# Patient Record
Sex: FEMALE | Race: WHITE | URBAN - METROPOLITAN AREA
[De-identification: names, ages, dates, MRNs, and addresses within clinical notes are randomized per-mention and may not be internally consistent; named-entity substitution may affect disease eponyms.]

---

## 2017-04-23 ENCOUNTER — OFFICE VISIT (OUTPATIENT)
Dept: URGENT CARE | Facility: RETAIL CLINIC | Age: 67
End: 2017-04-23
Payer: COMMERCIAL

## 2017-04-23 VITALS — TEMPERATURE: 99.6 F | OXYGEN SATURATION: 96 % | HEART RATE: 78 BPM | WEIGHT: 178 LBS | RESPIRATION RATE: 12 BRPM

## 2017-04-23 DIAGNOSIS — N30.00 ACUTE CYSTITIS WITHOUT HEMATURIA: Primary | ICD-10-CM

## 2017-04-23 DIAGNOSIS — R30.0 DYSURIA: ICD-10-CM

## 2017-04-23 LAB
APPEARANCE UR: ABNORMAL
BILIRUB UR QL: ABNORMAL
COLOR UR: ABNORMAL
GLUCOSE URINE: ABNORMAL MG/DL
HGB UR QL: ABNORMAL
KETONES UR QL: 5 MG/DL
LEUKOCYTE ESTERASE URINE: ABNORMAL
NITRITE UR QL STRIP: ABNORMAL
PH UR STRIP: 6 PH (ref 5–7)
PROTEIN ALBUMIN URINE: ABNORMAL MG/DL
SOURCE: ABNORMAL
SP GR UR STRIP: 1.01 (ref 1–1.03)
UROBILINOGEN UR QL STRIP: 0.2 EU/DL (ref 0.2–1)

## 2017-04-23 PROCEDURE — 87086 URINE CULTURE/COLONY COUNT: CPT | Performed by: PHYSICIAN ASSISTANT

## 2017-04-23 PROCEDURE — 81002 URINALYSIS NONAUTO W/O SCOPE: CPT | Mod: QW | Performed by: PHYSICIAN ASSISTANT

## 2017-04-23 PROCEDURE — 99203 OFFICE O/P NEW LOW 30 MIN: CPT | Performed by: PHYSICIAN ASSISTANT

## 2017-04-23 RX ORDER — DULOXETIN HYDROCHLORIDE 60 MG/1
60 CAPSULE, DELAYED RELEASE ORAL DAILY
Qty: 30 CAPSULE | Refills: 1 | COMMUNITY
Start: 2017-04-23

## 2017-04-23 RX ORDER — CIPROFLOXACIN 250 MG/1
250 TABLET, FILM COATED ORAL 2 TIMES DAILY
Qty: 6 TABLET | Refills: 0 | Status: SHIPPED | OUTPATIENT
Start: 2017-04-23 | End: 2017-04-26

## 2017-04-23 RX ORDER — WARFARIN SODIUM 2 MG/1
2 TABLET ORAL DAILY
Qty: 30 TABLET | COMMUNITY
Start: 2017-04-23

## 2017-04-23 RX ORDER — LEVOTHYROXINE SODIUM 150 UG/1
150 TABLET ORAL DAILY
Qty: 90 TABLET | Refills: 1 | COMMUNITY
Start: 2017-04-23

## 2017-04-23 RX ORDER — QUETIAPINE 400 MG/1
400 TABLET, FILM COATED, EXTENDED RELEASE ORAL DAILY
Qty: 60 TABLET | COMMUNITY
Start: 2017-04-23

## 2017-04-23 RX ORDER — TRAZODONE HYDROCHLORIDE 50 MG/1
50 TABLET, FILM COATED ORAL
Qty: 30 TABLET | Refills: 1 | COMMUNITY
Start: 2017-04-23

## 2017-04-23 RX ORDER — HALOPERIDOL 5 MG/1
5 TABLET ORAL DAILY
Qty: 60 TABLET | Refills: 1 | COMMUNITY
Start: 2017-04-23

## 2017-04-23 RX ORDER — HYOSCYAMINE SULFATE 0.125 MG
0.125-0.25 TABLET ORAL EVERY 4 HOURS PRN
Qty: 40 TABLET | Refills: 1 | COMMUNITY
Start: 2017-04-23

## 2017-04-23 RX ORDER — PROPRANOLOL HYDROCHLORIDE 160 MG/1
160 CAPSULE, EXTENDED RELEASE ORAL 2 TIMES DAILY
Qty: 90 CAPSULE | Refills: 1 | COMMUNITY
Start: 2017-04-23

## 2017-04-23 RX ORDER — ATORVASTATIN CALCIUM 20 MG/1
20 TABLET, FILM COATED ORAL DAILY
Qty: 90 TABLET | Refills: 3 | COMMUNITY
Start: 2017-04-23

## 2017-04-23 RX ORDER — CEFADROXIL 500 MG/1
500 CAPSULE ORAL PRN
Qty: 1 CAPSULE | Status: CANCELLED | COMMUNITY
Start: 2017-04-23

## 2017-04-23 RX ORDER — HYDROCODONE BITARTRATE AND ACETAMINOPHEN 5; 325 MG/1; MG/1
1-2 TABLET ORAL EVERY 4 HOURS PRN
Qty: 20 TABLET | Refills: 0 | COMMUNITY
Start: 2017-04-23

## 2017-04-23 ASSESSMENT — PAIN SCALES - GENERAL: PAINLEVEL: MODERATE PAIN (5)

## 2017-04-23 NOTE — MR AVS SNAPSHOT
"              After Visit Summary   4/23/2017    Carmen Serrano    MRN: [id number]           Patient Information     Date Of Birth          1950        Visit Information        Provider Department      4/23/2017 1:30 PM Melani Uriostegui PA-C [facility]        Today's Diagnoses     Acute cystitis without hematuria    -  1    Dysuria          Care Instructions       * BLADDER INFECTION,Female (Adult)    A bladder infection (\"cystitis\" or \"UTI\") usually causes a constant urge to urinate and a burning when passing urine. Urine may be cloudy, smelly or dark. There may be pain in the lower abdomen. A bladder infection occurs when bacteria from the vaginal area enter the bladder opening (urethra). This can occur from sexual intercourse, wearing tight clothing, dehydration and other factors.  HOME CARE:  1. Drink lots of fluids (at least 6-8 glasses a day, unless you must restrict fluids for other medical reasons). This will force the medicine into your urinary system and flush the bacteria out of your body. Cranberry juice has been shown to help clear out the bacteria.  2. Avoid sexual intercourse until your symptoms are gone.  3. A bladder infection is treated with antibiotics. You may also be given Pyridium (generic = phenazopyridine) to reduce the burning sensation. This medicine will cause your urine to become a bright orange color. The orange urine may stain clothing. You may wear a pad or panty-liner to protect clothing.  PREVENTING FUTURE INFECTIONS:  1. Always wipe from front to back after a bowel movement.  2. Keep the genital area clean and dry.  3. Drink plenty of fluids each day to avoid dehydration.  4. Urinate right after intercourse to flush out the bladder.  5. Wear cotton underwear and cotton-lined panty hose; avoid tight-fitting pants.  6. If you are on birth control pills and are having frequent bladder infections, discuss with your doctor.  FOLLOW UP: Return to this " "facility or see your doctor if ALL symptoms are not gone after three days of treatment.  GET PROMPT MEDICAL ATTENTION if any of the following occur:    Fever over 101 F (38.3 C)    No improvement by the third day of treatment    Increasing back or abdominal pain    Repeated vomiting; unable to keep medicine down    Weakness, dizziness or fainting    Vaginal discharge    Pain, redness or swelling in the labia (outer vaginal area)    5470-1289 [employer], [address] [place] [address], [place] [address]. All rights reserved. This information is not intended as a substitute for professional medical care. Always follow your healthcare professional's instructions.    ............................  We will contact you if the urine culture indicates a change in treatment plan.    ............................    Please let your coumadin clinic know tomorrow we are starting you on Cipro for bladder infection.   Culture pending.          Follow-ups after your visit        Who to contact     You can reach your care team any time of the day by calling [phone].  Notification of test results:  If you have an abnormal lab result, we will notify you by phone as soon as possible.         Additional Information About Your Visit        [employer] Information     [employer] lets you send messages to your doctor, view your test results, renew your prescriptions, schedule appointments and more. To sign up, go to [email or web address] . Click on \"Log in\" on the left side of the screen, which will take you to the Welcome page. Then click on \"Sign up Now\" on the right side of the page.     You will be asked to enter the access code listed below, as well as some personal information. Please follow the directions to create your username and password.     Your access code is: [email or web address]  Expires: 2017  1:45 PM     Your access code will  in 90 days. If you need help or a new code, please call your [facility] clinic or " [phone].        Care EveryWhere ID     This is your Care EveryWhere ID. This could be used by other organizations to access your Carmen medical records  [id number]        Your Vitals Were     Pulse Temperature Respirations Pulse Oximetry          78 99.6  F (37.6  C) (Tympanic) 12 96%         Blood Pressure from Last 3 Encounters:   12/27/12 (!) 140/103    Weight from Last 3 Encounters:   04/23/17 178 lb (80.7 kg)              We Performed the Following     HCL U/A, W/O MICRO, NON AUTO     Urine Culture Aerobic Bacterial          Today's Medication Changes          These changes are accurate as of: 4/23/17  1:45 PM.  If you have any questions, ask your nurse or doctor.               Start taking these medicines.        Dose/Directions    ciprofloxacin 250 MG tablet   Commonly known as:  CIPRO   Used for:  Acute cystitis without hematuria   Started by:  Melani Uriostegui PA-C        Dose:  250 mg   Take 1 tablet (250 mg) by mouth 2 times daily   Quantity:  6 tablet   Refills:  0            Where to get your medicines      These medications were sent to [address]  [address] [facility] [address]     Phone:  [phone]     ciprofloxacin 250 MG tablet                Primary Care Provider    None Specified       No primary provider on file.        Thank you!     Thank you for choosing [facility]  for your care. Our goal is always to provide you with excellent care. Hearing back from our patients is one way we can continue to improve our services. Please take a few minutes to complete the written survey that you may receive in the mail after your visit with us. Thank you!             Your Updated Medication List - Protect others around you: Learn how to safely use, store and throw away your medicines at www.disposemymeds.org.          This list is accurate as of: 4/23/17  1:45 PM.  Always use your most recent med list.                   Brand Name  Dispense Instructions for use    ciprofloxacin 250 MG tablet    CIPRO    6 tablet    Take 1 tablet (250 mg) by mouth 2 times daily       * CYMBALTA PO      Take  by mouth.       * DULoxetine 60 MG EC capsule    CYMBALTA    30 capsule    Take 1 capsule (60 mg) by mouth daily       haloperidol 5 MG tablet    HALDOL    60 tablet    Take 1 tablet (5 mg) by mouth daily       HYDROcodone-acetaminophen 5-325 MG per tablet    NORCO    20 tablet    Take 1-2 tablets by mouth every 4 hours as needed for moderate to severe pain       hyoscyamine 0.125 MG tablet    ANASPAZ/LEVSIN    40 tablet    Take 1-2 tablets (125-250 mcg) by mouth every 4 hours as needed for cramping       * Levothyroxine Sodium 125 MCG Caps      Take  by mouth.       * levothyroxine 150 MCG tablet    SYNTHROID/LEVOTHROID    90 tablet    Take 1 tablet (150 mcg) by mouth daily       LIPITOR 20 MG tablet   Generic drug:  atorvastatin     90 tablet    Take 1 tablet (20 mg) by mouth daily       omeprazole 20 MG CR capsule    priLOSEC    30 capsule    Take 1 capsule (20 mg) by mouth 2 times daily       PREMARIN PO      Take  by mouth.       PRILOSEC PO      Take  by mouth.       * PROPRANOLOL HCL PO      Take  by mouth.       * propranolol 160 MG 24 hr capsule    INDERAL LA    90 capsule    Take 1 capsule (160 mg) by mouth 2 times daily       SEROQUEL  MG 24 hr tablet   Generic drug:  QUEtiapine     60 tablet    Take 1 tablet (400 mg) by mouth daily       traZODone 50 MG tablet    DESYREL    30 tablet    Take 1 tablet (50 mg) by mouth nightly as needed for sleep       warfarin 2 MG tablet    COUMADIN    30 tablet    Take 1 tablet (2 mg) by mouth daily       * Notice:  This list has 6 medication(s) that are the same as other medications prescribed for you. Read the directions carefully, and ask your doctor or other care provider to review them with you.

## 2017-04-23 NOTE — PATIENT INSTRUCTIONS
"   * BLADDER INFECTION,Female (Adult)    A bladder infection (\"cystitis\" or \"UTI\") usually causes a constant urge to urinate and a burning when passing urine. Urine may be cloudy, smelly or dark. There may be pain in the lower abdomen. A bladder infection occurs when bacteria from the vaginal area enter the bladder opening (urethra). This can occur from sexual intercourse, wearing tight clothing, dehydration and other factors.  HOME CARE:  1. Drink lots of fluids (at least 6-8 glasses a day, unless you must restrict fluids for other medical reasons). This will force the medicine into your urinary system and flush the bacteria out of your body. Cranberry juice has been shown to help clear out the bacteria.  2. Avoid sexual intercourse until your symptoms are gone.  3. A bladder infection is treated with antibiotics. You may also be given Pyridium (generic = phenazopyridine) to reduce the burning sensation. This medicine will cause your urine to become a bright orange color. The orange urine may stain clothing. You may wear a pad or panty-liner to protect clothing.  PREVENTING FUTURE INFECTIONS:  1. Always wipe from front to back after a bowel movement.  2. Keep the genital area clean and dry.  3. Drink plenty of fluids each day to avoid dehydration.  4. Urinate right after intercourse to flush out the bladder.  5. Wear cotton underwear and cotton-lined panty hose; avoid tight-fitting pants.  6. If you are on birth control pills and are having frequent bladder infections, discuss with your doctor.  FOLLOW UP: Return to this facility or see your doctor if ALL symptoms are not gone after three days of treatment.  GET PROMPT MEDICAL ATTENTION if any of the following occur:    Fever over 101 F (38.3 C)    No improvement by the third day of treatment    Increasing back or abdominal pain    Repeated vomiting; unable to keep medicine down    Weakness, dizziness or fainting    Vaginal discharge    Pain, redness or swelling in " the labia (outer vaginal area)    [id number] [employer], [address] [place] [address], [place] [address]. All rights reserved. This information is not intended as a substitute for professional medical care. Always follow your healthcare professional's instructions.    ............................  We will contact you if the urine culture indicates a change in treatment plan.    ............................    Please let your coumadin clinic know tomorrow we are starting you on Cipro for bladder infection.   Culture pending.

## 2017-04-23 NOTE — PROGRESS NOTES
Carmen Serrano is a 66 year old female who  Presents to the [facility] today for a possible UTI. Symptoms of dysuria, frequency, and polyuria have been going on since yesterday.  There is no history of backache, fever, chills, nausea or vomiting.  No recent history of vaginal discharge or infection. No history of pyelonephritis. No concerns for STD's. This patient does  have a history of urinary tract infections - last about 6 months ago -treated with Cipro (states no problems)     Visiting here from [place] -will be here x 2 more weeks.    Has INR being done tomorrow and sending results to her Coumadin Clinic. On coumadin for about year.    Current Outpatient Prescriptions   Medication     warfarin (COUMADIN) 2 MG tablet     propranolol (INDERAL LA) 160 MG 24 hr capsule     HYDROcodone-acetaminophen (NORCO) 5-325 MG per tablet     levothyroxine (SYNTHROID/LEVOTHROID) 150 MCG tablet     atorvastatin (LIPITOR) 20 MG tablet     hyoscyamine (ANASPAZ/LEVSIN) 0.125 MG tablet     omeprazole (PRILOSEC) 20 MG CR capsule     DULoxetine (CYMBALTA) 60 MG EC capsule     traZODone (DESYREL) 50 MG tablet     haloperidol (HALDOL) 5 MG tablet     QUEtiapine (SEROQUEL XR) 400 MG 24 hr tablet     ciprofloxacin (CIPRO) 250 MG tablet     DULoxetine HCl (CYMBALTA PO)     PROPRANOLOL HCL PO     Omeprazole (PRILOSEC PO)     Estrogens Conjugated (PREMARIN PO)     Levothyroxine Sodium 125 MCG CAPS     No current facility-administered medications for this visit.          No past medical history on file.  Past Surgical History:   Procedure Laterality Date     ORTHOPEDIC SURGERY  6/2012    knee replacement     There is no problem list on file for this patient.    Current Outpatient Prescriptions   Medication     DULoxetine HCl (CYMBALTA PO)     PROPRANOLOL HCL PO     Omeprazole (PRILOSEC PO)     Estrogens Conjugated (PREMARIN PO)     Levothyroxine Sodium 125 MCG CAPS     No current facility-administered medications for this  "visit.        O: Pulse 78  Temp 99.6  F (37.6  C) (Tympanic)  Resp 12  Wt 178 lb (80.7 kg)  SpO2 96%  BP declined     The patient appears comfortable and in no apparent distress.  Afebrile.  Back: no CVA or flank tenderness.  Abdomen: soft, positive for bowel sounds, some  supra pubic tenderness.  Neck: supple, no adenopathy, and thyroid normal size, non-tender, without nodularity.  Lungs -clear    See lab results.    A:    Dysuria  Acute cystitis without hematuria    P: Prescriptions as below. Discussed indications, dosing, side affects and adverse reactions of medications with  Patient  Cipro  Take a probiotic or eat yogurt while on antibiotics.  Drink plenty of fluids.    Prevention and treatment of UTI's discussed.  Signs and symptoms of pyelonephritis mentioned.    Urine culture pending. Pt will be contacted if change in treatment plan is indicated.  If symptoms do not improve in a few days or if at anytime she is worse she will follow up with her primary care provider.    See AVS - to contact her Coumadin Clinic she is starting Cipro.     AVS given and discussed:    Patient Instructions      * BLADDER INFECTION,Female (Adult)    A bladder infection (\"cystitis\" or \"UTI\") usually causes a constant urge to urinate and a burning when passing urine. Urine may be cloudy, smelly or dark. There may be pain in the lower abdomen. A bladder infection occurs when bacteria from the vaginal area enter the bladder opening (urethra). This can occur from sexual intercourse, wearing tight clothing, dehydration and other factors.  HOME CARE:  1. Drink lots of fluids (at least 6-8 glasses a day, unless you must restrict fluids for other medical reasons). This will force the medicine into your urinary system and flush the bacteria out of your body. Cranberry juice has been shown to help clear out the bacteria.  2. Avoid sexual intercourse until your symptoms are gone.  3. A bladder infection is treated with antibiotics. You " may also be given Pyridium (generic = phenazopyridine) to reduce the burning sensation. This medicine will cause your urine to become a bright orange color. The orange urine may stain clothing. You may wear a pad or panty-liner to protect clothing.  PREVENTING FUTURE INFECTIONS:  1. Always wipe from front to back after a bowel movement.  2. Keep the genital area clean and dry.  3. Drink plenty of fluids each day to avoid dehydration.  4. Urinate right after intercourse to flush out the bladder.  5. Wear cotton underwear and cotton-lined panty hose; avoid tight-fitting pants.  6. If you are on birth control pills and are having frequent bladder infections, discuss with your doctor.  FOLLOW UP: Return to this facility or see your doctor if ALL symptoms are not gone after three days of treatment.  GET PROMPT MEDICAL ATTENTION if any of the following occur:    Fever over 101 F (38.3 C)    No improvement by the third day of treatment    Increasing back or abdominal pain    Repeated vomiting; unable to keep medicine down    Weakness, dizziness or fainting    Vaginal discharge    Pain, redness or swelling in the labia (outer vaginal area)    2096-5538 The [employer] [address] [place] [address]. All rights reserved. This information is not intended as a substitute for professional medical care. Always follow your healthcare professional's instructions.    ............................  We will contact you if the urine culture indicates a change in treatment plan.    ............................    Please let your coumadin clinic know tomorrow we are starting you on Cipro for bladder infection.   Culture pending.      Patient is comfortable with this plan.  Electronically signed,  YOLANDE Uriostegui, PAC

## 2017-04-24 DIAGNOSIS — D68.51 FACTOR V LEIDEN (H): ICD-10-CM

## 2017-04-24 DIAGNOSIS — I48.91 ATRIAL FIBRILLATION (H): Primary | ICD-10-CM

## 2017-04-24 LAB — INR BLD: 4.8 (ref 0.86–1.14)

## 2017-04-24 PROCEDURE — 85610 PROTHROMBIN TIME: CPT | Mod: QW

## 2017-04-24 PROCEDURE — 36416 COLLJ CAPILLARY BLOOD SPEC: CPT

## 2017-04-25 ENCOUNTER — TELEPHONE (OUTPATIENT)
Dept: FAMILY MEDICINE | Facility: OTHER | Age: 67
End: 2017-04-25

## 2017-04-25 LAB
BACTERIA SPEC CULT: NORMAL
MICRO REPORT STATUS: NORMAL
SPECIMEN SOURCE: NORMAL

## 2017-04-25 NOTE — TELEPHONE ENCOUNTER
Reason for call:  Results  Reason for Call:  Request for results:    Name of test or procedure: lab    Date of test of procedure: 4/23/17    Location of the test or procedure: [facility] to leave the result message on voice mail or with a family member? YES    Phone number Patient can be reached at:  Cell number on file:    Telephone Information:   Mobile [phone]       Additional comments: please call with results    Call taken on 4/25/2017 at 3:22 PM by Anna Sweeney

## 2017-04-26 ENCOUNTER — TELEPHONE (OUTPATIENT)
Dept: URGENT CARE | Facility: RETAIL CLINIC | Age: 67
End: 2017-04-26

## 2017-04-26 NOTE — TELEPHONE ENCOUNTER
Spoke with patient - see UC - probable contamination. She is still having some symptoms of foul smelling urine - she needs to be seen in clinic or UC for further eval. Advised to DC Cipro.  She is comfortable with this plan.  YOLANDE Uriostegui, PAC

## 2017-05-01 DIAGNOSIS — I48.91 ATRIAL FIBRILLATION (H): ICD-10-CM

## 2017-05-01 DIAGNOSIS — D68.51 FACTOR V LEIDEN (H): ICD-10-CM

## 2017-05-01 LAB — INR BLD: 3.8 (ref 0.86–1.14)

## 2017-05-01 PROCEDURE — 85610 PROTHROMBIN TIME: CPT | Mod: QW

## 2017-05-01 PROCEDURE — 36416 COLLJ CAPILLARY BLOOD SPEC: CPT

## 2017-05-01 NOTE — TELEPHONE ENCOUNTER
Spoke with pt and she is stated that she has not heard about her INR from her PCP in [place]. Checked with lab and lab said it was sent and will resend just to make sure. Pt informed to call PCP for results/instructions.      Leigh Cisneros CMA (AAMA)